# Patient Record
Sex: FEMALE
[De-identification: names, ages, dates, MRNs, and addresses within clinical notes are randomized per-mention and may not be internally consistent; named-entity substitution may affect disease eponyms.]

---

## 2017-02-28 ENCOUNTER — RECORDS - HEALTHEAST (OUTPATIENT)
Dept: ADMINISTRATIVE | Facility: OTHER | Age: 37
End: 2017-02-28

## 2017-05-12 ENCOUNTER — RECORDS - HEALTHEAST (OUTPATIENT)
Dept: ADMINISTRATIVE | Facility: OTHER | Age: 37
End: 2017-05-12

## 2017-06-12 ENCOUNTER — RECORDS - HEALTHEAST (OUTPATIENT)
Dept: ADMINISTRATIVE | Facility: OTHER | Age: 37
End: 2017-06-12

## 2017-06-26 ENCOUNTER — RECORDS - HEALTHEAST (OUTPATIENT)
Dept: ADMINISTRATIVE | Facility: OTHER | Age: 37
End: 2017-06-26

## 2017-09-11 ENCOUNTER — RECORDS - HEALTHEAST (OUTPATIENT)
Dept: ADMINISTRATIVE | Facility: OTHER | Age: 37
End: 2017-09-11

## 2018-01-26 ENCOUNTER — RECORDS - HEALTHEAST (OUTPATIENT)
Dept: ADMINISTRATIVE | Facility: OTHER | Age: 38
End: 2018-01-26

## 2018-02-07 ENCOUNTER — RECORDS - HEALTHEAST (OUTPATIENT)
Dept: ADMINISTRATIVE | Facility: OTHER | Age: 38
End: 2018-02-07

## 2018-06-07 ENCOUNTER — RECORDS - HEALTHEAST (OUTPATIENT)
Dept: ADMINISTRATIVE | Facility: OTHER | Age: 38
End: 2018-06-07

## 2018-11-01 ENCOUNTER — OFFICE VISIT - HEALTHEAST (OUTPATIENT)
Dept: FAMILY MEDICINE | Facility: CLINIC | Age: 38
End: 2018-11-01

## 2018-11-01 DIAGNOSIS — G43.909 MIGRAINE HEADACHE: ICD-10-CM

## 2018-11-01 DIAGNOSIS — R10.13 EPIGASTRIC PAIN: ICD-10-CM

## 2018-11-01 DIAGNOSIS — R11.0 NAUSEA: ICD-10-CM

## 2018-11-01 RX ORDER — SUMATRIPTAN 50 MG/1
50 TABLET, FILM COATED ORAL
Qty: 15 TABLET | Refills: 0 | Status: SHIPPED | OUTPATIENT
Start: 2018-11-01

## 2018-12-05 ENCOUNTER — COMMUNICATION - HEALTHEAST (OUTPATIENT)
Dept: FAMILY MEDICINE | Facility: CLINIC | Age: 38
End: 2018-12-05

## 2018-12-05 DIAGNOSIS — R10.13 EPIGASTRIC PAIN: ICD-10-CM

## 2018-12-05 DIAGNOSIS — R11.0 NAUSEA: ICD-10-CM

## 2021-06-02 VITALS — WEIGHT: 154 LBS

## 2021-06-21 NOTE — PROGRESS NOTES
Assessment/Plan:     1. Migraine headache  Symptoms appear consistent with migraine headaches.  Patient given a Toradol injection in clinic today; tolerated well.  Instructed to avoid an additional NSAIDs today.  It does not appear that she has ever been prescribed any abortive or prophylactic medications for migraine headaches.  Will trial patient on Imitrex, 50 mg at onset of headache.  May repeat every 2 hours, up to 200 mg/day.  Discussed proper use and possible side effects of this medication.  May take with Excedrin and Tylenol as needed.  Recommend follow up in 1 month to discuss progress.  Patient declines to make appointment today.   - ketorolac injection 30 mg (TORADOL); Inject 1 mL (30 mg total) into the shoulder, thigh, or buttocks once.  - SUMAtriptan (IMITREX) 50 MG tablet; Take 1 tablet (50 mg total) by mouth every 2 (two) hours as needed for migraine. Up to 200 mg/day.  Take at the first onset of headache.  Dispense: 15 tablet; Refill: 0    2. Epigastric pain  Epigastric pain and nausea without heartburn symptoms.  Trial Omeprazole once daily on an empty stomach before breakfast x 4 weeks.  Discussed dietary modifications.   - omeprazole (PRILOSEC) 20 MG capsule; Take 1 capsule (20 mg total) by mouth daily before breakfast.  Dispense: 30 capsule; Refill: 0    3. Nausea  Possibly secondary to what appears like migraine headaches.  Could be associated with epigastric pain.    - omeprazole (PRILOSEC) 20 MG capsule; Take 1 capsule (20 mg total) by mouth daily before breakfast.  Dispense: 30 capsule; Refill: 0        Subjective:     Nery Adams is a 38 y.o. female who presents with complaints of headaches, nausea, and abdominal discomfort.  An  is unavailable, therefore we are using the  phone line.  Patient complains of a headache for the past 3-4 days.  States she is waking up with a headache.  Pain mostly at the back of her head.  Associated symptoms include nausea and  sensitivity to light/sound.  Denies any change in vision or dizziness/lightheadedness.  It appears that patient has been seen several times in the past for headaches.  It looks like she has only been treated for her acute symptoms.  She does have a headache right now.  She has tried Excedrin, hydration, and warm towels.    Patient also endorses abdominal bloating and epigastric pain.  She has had nausea for the past 3-4 days, and her appetite has been very low.  Denies any fever, vomiting, or diarrhea.  No urinary symptoms.  Pain is worse when she eats/drinks.  She is having regular, formed bowel movements.  She denies any heartburn symptoms.      The following portions of the patient's history were reviewed and updated as appropriate: allergies, current medications.    Review of Systems  A comprehensive review of systems was performed and was otherwise negative    Objective:     /88 (Patient Site: Left Arm, Patient Position: Sitting, Cuff Size: Adult Large)  Pulse 64  Temp 98.2  F (36.8  C) (Oral)   Wt 154 lb (69.9 kg)  LMP 10/17/2018 (Exact Date)    General Appearance: Alert, cooperative, no distress, appears stated age  Head: Normocephalic, without obvious abnormality, atraumatic  Eyes: PERRL, conjunctiva/corneas clear, EOM's intact. No nystagmus.  Neck: Supple, symmetrical, trachea midline, no adenopathy  Back: no CVA tenderness  Lungs: Clear to auscultation bilaterally, respirations unlabored  Heart: Regular rate and rhythm, S1 and S2 normal, no murmur, rub, or gallop   Abdomen: Soft, mild epigastric tenderness, bowel sounds active all four quadrants,  no masses, no organomegaly  Neurologic: A&Ox4. No gross focal deficits. Normal gait.    ADAM Rodriguez